# Patient Record
(demographics unavailable — no encounter records)

---

## 2024-12-09 NOTE — ASSESSMENT
[FreeTextEntry1] : 80 yo female s/p a left common iliac angioplasty and stent placement for left lower extremity disabling claudication.   She continues to have a 2+ femoral and popliteal artery pulses. She now has a 2+ dp pulse on the left and continues to ambulate without calf pain.   I still do not believe her  pain is of vascular etiology. I recommended she undergo exercise ABIs to assess for underlying arterial disease.   Continue aspirin as prescribed.  She will follow up when the exercising ABIs are available.

## 2024-12-09 NOTE — END OF VISIT
[FreeTextEntry3] :  All medical record entries made by the sheilaibe were at my, GENNA BURK, direction and personally dictated by me on 12/9/2024. I have reviewed the chart and agree that the record accurately reflects my personal performance of the history, physical exam, assessment, and plan. I have also personally directed, reviewed, and agreed with the chart.

## 2024-12-09 NOTE — HISTORY OF PRESENT ILLNESS
[FreeTextEntry1] : 76 yo female presents for an initial evaluation of left lower extremity claudication.  She reports a history of  left knee pain, treated with gel injections and PT, without significant improvement of her pain. She reports that she can no longer go to the supermarket because of severe pain in her leg with ambulation. The patient is a former smoker but quit >10 years ago. She denies a history of DM.She underwent a left lower extremity arterial duplex which demonstrated inflow disease.  She has a history of small cell lung CA, now in remission after a partial  lobectomy. [de-identified] : 78 yo female returns in follow up. She is s/p a left common iliac angioplasty and stent placement for left lower extremity claudication. The patient reports that she continues to have bilateral foot pain and occasional cramps.  She reports she is having difficulty walking up and down the stairs. She denies pain with ambulation.but reports that her legs are more tired and that she has to walk slower. She reports that she was seen by an orthopedist who did not think her symptoms were secondary to spinal pathology. She also states she was seen by an neurologist since the last visit who did not think it was from a neurologic pathology.   She continues to take aspirin as prescribed.

## 2024-12-09 NOTE — PHYSICAL EXAM
[Normal Breath Sounds] : Normal breath sounds [Ankle Swelling Bilaterally] : bilaterally  [Alert] : alert [Oriented to Person] : oriented to person [Oriented to Place] : oriented to place [Oriented to Time] : oriented to time [JVD] : no jugular venous distention  [2+] : right 2+ [Ankle Swelling (On Exam)] : not present [de-identified] : Awake and Alert. [FreeTextEntry1] : Biphasic left dp/pt signal.   [de-identified] : bilateral feet warm pink and well perfused, no edema bilaterally [de-identified] : No gross motor or sensory deficits. [de-identified] : Appropriate affect.

## 2025-02-10 NOTE — PHYSICAL EXAM
[Normal Breath Sounds] : Normal breath sounds [Ankle Swelling Bilaterally] : bilaterally  [Alert] : alert [Oriented to Person] : oriented to person [Oriented to Place] : oriented to place [Oriented to Time] : oriented to time [JVD] : no jugular venous distention  [2+] : right 2+ [0] : left 0 [Ankle Swelling (On Exam)] : not present [de-identified] : Awake and Alert. [FreeTextEntry1] : Biphasic left dp/pt signal.   [de-identified] : bilateral feet warm pink and well perfused, no edema bilaterally [de-identified] : No gross motor or sensory deficits. [de-identified] : Appropriate affect.

## 2025-02-10 NOTE — END OF VISIT
[FreeTextEntry3] :  All medical record entries made by the sheilaibe were at myBHARGAVI KENNETH, direction and personally dictated by me on 2/10/2025. I have reviewed the chart and agree that the record accurately reflects my personal performance of the history, physical exam, assessment, and plan. I have also personally directed, reviewed, and agreed with the chart.

## 2025-02-10 NOTE — ASSESSMENT
[FreeTextEntry1] : 78 yo female s/p a left common iliac angioplasty and stent placement for left lower extremity disabling claudication.   She reports cramping of her legs at night. She denies pain in her calves when walking during the day. She continues to have a 2+ femoral  artery pulses.  She underwent exercise ABIs which demonstrated mild arterial disease bilaterally at rest and moderate disease bl with exercise. We discussed undergoing an intervention; however I explained to her that I did not think arterial insufficiency was causing her nightly cramps which is her biggest complaint. She was instructed to start taking Isidoro's nighttime leg cramp formula. She will walk as much as possible to determine if she has significant  pain with ambulation. She will follow up in 3 months.   Continue aspirin as prescribed.

## 2025-02-10 NOTE — REVIEW OF SYSTEMS
[Fever] : no fever [Chills] : no chills [Leg Claudication] : no intermittent leg claudication [Lower Ext Edema] : no lower extremity edema [Limb Pain] : no limb pain [Limb Swelling] : no limb swelling [Limb Weakness] : limb weakness

## 2025-02-10 NOTE — HISTORY OF PRESENT ILLNESS
[FreeTextEntry1] : 76 yo female presents for an initial evaluation of left lower extremity claudication.  She reports a history of  left knee pain, treated with gel injections and PT, without significant improvement of her pain. She reports that she can no longer go to the supermarket because of severe pain in her leg with ambulation. The patient is a former smoker but quit >10 years ago. She denies a history of DM.She underwent a left lower extremity arterial duplex which demonstrated inflow disease.  She has a history of small cell lung CA, now in remission after a partial  lobectomy. [de-identified] : 78 yo female returns in follow up. She is s/p a left common iliac angioplasty and stent placement for left lower extremity claudication. The patient reports that she continues to have bilateral foot pain and occasional cramps.  She reports she is having difficulty walking up and down the stairs. She denies pain with ambulation but reports that her legs are more tired and that she has to walk slower. She continues to take aspirin as prescribed. She underwent exercise arterial testing and is here to discuss the results and additional treatment options.

## 2025-02-10 NOTE — DATA REVIEWED
[FreeTextEntry1] : Arterial testing - personally reviewed -  Right: mild arterial disease at rest, moderate with exercise Left: mild arterial disease at rest, moderate with exercise

## 2025-05-19 NOTE — PHYSICAL EXAM
[Normal Breath Sounds] : Normal breath sounds [2+] : right 2+ [0] : left 0 [Ankle Swelling Bilaterally] : bilaterally  [Alert] : alert [Oriented to Person] : oriented to person [Oriented to Place] : oriented to place [Oriented to Time] : oriented to time [JVD] : no jugular venous distention  [Ankle Swelling (On Exam)] : not present [de-identified] : Awake and Alert. [FreeTextEntry1] : Biphasic left pt signal.   [de-identified] : bilateral feet warm pink and well perfused, no edema bilaterally [de-identified] : No gross motor or sensory deficits. [de-identified] : Appropriate affect.

## 2025-05-19 NOTE — ASSESSMENT
[FreeTextEntry1] : 78 yo female s/p a left common iliac angioplasty and stent placement for left lower extremity disabling claudication.   She no longer has significant  cramping of her legs at night. She denies pain in her calves when walking during the day. She continues to have a 2+ femoral  artery pulses bl.  She underwent exercise ABIs last visit which demonstrated mild arterial disease bilaterally at rest and moderate disease bl with exercise. She denies pain in her calves with ambulation.  I do not think there is an indication for vascular intervention at this time.  She will follow up in 6 months for arterial testing sooner if she develops a problem.  Continue aspirin as prescribed.

## 2025-05-19 NOTE — REVIEW OF SYSTEMS
[Limb Weakness] : limb weakness [Fever] : no fever [Chills] : no chills [Leg Claudication] : no intermittent leg claudication [Lower Ext Edema] : no lower extremity edema [Limb Pain] : no limb pain [Limb Swelling] : no limb swelling

## 2025-05-19 NOTE — HISTORY OF PRESENT ILLNESS
[FreeTextEntry1] : 78 yo female presents for an initial evaluation of left lower extremity claudication.  She reports a history of  left knee pain, treated with gel injections and PT, without significant improvement of her pain. She reports that she can no longer go to the supermarket because of severe pain in her leg with ambulation. The patient is a former smoker but quit >10 years ago. She denies a history of DM.She underwent a left lower extremity arterial duplex which demonstrated inflow disease.  She has a history of small cell lung CA, now in remission after a partial  lobectomy. [de-identified] : 80 yo female well known to me returns in follow up. She is s/p a left common iliac angioplasty and stent placement for left lower extremity claudication. The patient reports that her nighttime cramping has improved since the last appointment. She reports she is able to walk up the stairs without any pain/cramping.  She denies pain in her calves with ambulation.  She does reports that her ambulation distance is now limited by left knee pain, otherwise her symptoms have improved.  She continues to take aspirin as prescribed.

## 2025-05-19 NOTE — END OF VISIT
[FreeTextEntry3] :  All medical record entries made by the sheilaibe were at myBHARGAVI KENNETH, direction and personally dictated by me on 5/19/2025. I have reviewed the chart and agree that the record accurately reflects my personal performance of the history, physical exam, assessment, and plan. I have also personally directed, reviewed, and agreed with the chart.